# Patient Record
Sex: FEMALE | Race: WHITE | NOT HISPANIC OR LATINO
[De-identification: names, ages, dates, MRNs, and addresses within clinical notes are randomized per-mention and may not be internally consistent; named-entity substitution may affect disease eponyms.]

---

## 2016-11-27 RX ORDER — CEPHALEXIN 500 MG
1 CAPSULE ORAL
Qty: 0 | Refills: 0 | DISCHARGE
Start: 2016-11-27 | End: 2016-12-04

## 2019-05-20 ENCOUNTER — RESULT REVIEW (OUTPATIENT)
Age: 34
End: 2019-05-20

## 2022-04-06 ENCOUNTER — TRANSCRIPTION ENCOUNTER (OUTPATIENT)
Age: 37
End: 2022-04-06

## 2022-04-07 ENCOUNTER — EMERGENCY (EMERGENCY)
Facility: HOSPITAL | Age: 37
LOS: 1 days | Discharge: HOME | End: 2022-04-07
Attending: EMERGENCY MEDICINE
Payer: COMMERCIAL

## 2022-04-07 VITALS
TEMPERATURE: 98 F | HEIGHT: 63 IN | SYSTOLIC BLOOD PRESSURE: 132 MMHG | HEART RATE: 80 BPM | RESPIRATION RATE: 18 BRPM | WEIGHT: 136.91 LBS | OXYGEN SATURATION: 99 % | DIASTOLIC BLOOD PRESSURE: 71 MMHG

## 2022-04-07 VITALS
HEART RATE: 76 BPM | RESPIRATION RATE: 18 BRPM | OXYGEN SATURATION: 99 % | TEMPERATURE: 98 F | DIASTOLIC BLOOD PRESSURE: 75 MMHG | SYSTOLIC BLOOD PRESSURE: 109 MMHG

## 2022-04-07 PROCEDURE — 99284 EMERGENCY DEPT VISIT MOD MDM: CPT

## 2022-04-07 PROCEDURE — 73140 X-RAY EXAM OF FINGER(S): CPT

## 2022-04-07 PROCEDURE — 90715 TDAP VACCINE 7 YRS/> IM: CPT

## 2022-04-07 PROCEDURE — 73140 X-RAY EXAM OF FINGER(S): CPT | Mod: 26,RT

## 2022-04-07 PROCEDURE — 12041 INTMD RPR N-HF/GENIT 2.5CM/<: CPT

## 2022-04-07 PROCEDURE — 99284 EMERGENCY DEPT VISIT MOD MDM: CPT | Mod: 25

## 2022-04-07 RX ORDER — TETANUS TOXOID, REDUCED DIPHTHERIA TOXOID AND ACELLULAR PERTUSSIS VACCINE, ADSORBED 5; 2.5; 8; 8; 2.5 [IU]/.5ML; [IU]/.5ML; UG/.5ML; UG/.5ML; UG/.5ML
0.5 SUSPENSION INTRAMUSCULAR ONCE
Refills: 0 | Status: COMPLETED | OUTPATIENT
Start: 2022-04-07 | End: 2022-04-07

## 2022-04-07 RX ORDER — ACETAMINOPHEN 500 MG
975 TABLET ORAL ONCE
Refills: 0 | Status: COMPLETED | OUTPATIENT
Start: 2022-04-07 | End: 2022-04-07

## 2022-04-07 RX ORDER — CEPHALEXIN 500 MG
1 CAPSULE ORAL
Qty: 20 | Refills: 0
Start: 2022-04-07 | End: 2022-04-11

## 2022-04-07 RX ORDER — CEPHALEXIN 500 MG
500 CAPSULE ORAL ONCE
Refills: 0 | Status: COMPLETED | OUTPATIENT
Start: 2022-04-07 | End: 2022-04-07

## 2022-04-07 RX ADMIN — TETANUS TOXOID, REDUCED DIPHTHERIA TOXOID AND ACELLULAR PERTUSSIS VACCINE, ADSORBED 0.5 MILLILITER(S): 5; 2.5; 8; 8; 2.5 SUSPENSION INTRAMUSCULAR at 18:50

## 2022-04-07 RX ADMIN — Medication 500 MILLIGRAM(S): at 21:19

## 2022-04-07 RX ADMIN — Medication 975 MILLIGRAM(S): at 18:50

## 2022-04-07 NOTE — ED ADULT NURSE NOTE - OBJECTIVE STATEMENT
37 yo presents to the ED from home. A&Ox4, ambulatory c/o laceration on R hand. pmh of psoriasis of the right hand. Patient was wiping dust from her [place] when she cut her hand on a shard of glass. The piece of glass went in through the radial aspect of the right ring DIP out the ulnar aspect of the right ring finger, and then into the medial aspect of the middle finger. Pt is right hand dominant. Endorses profuse bleeding at time of injury. Dtap is UTD. Patient reports removing glass from hand after injury. Reports nausea due to the pain. Denies fevers, chills, or vomiting. well appearing. plan of care discussed. safety and comfort measures maintained.

## 2022-04-07 NOTE — ED PROVIDER NOTE - PROGRESS NOTE DETAILS
Attending MD Espinal: Dr. Moura of hand bedside [facility] RIZZO: Pt is stable and ready for discharge. Strict return precautions given. All questions answered. Pt understands the need to follow up with hand surgery within the next week. Dr. Moura aware of plan and will have his office contact the patient. Attending MD Espinal: Patient seen and wounds dressed by Dr. Moura.  Patient will need surgical intervention if wishes to have foreign body removed.  Patient will receive call tomorrow or Monday from Dr. Moura's office to set up follow up.  Will give first dose of Keflex here.  Requested check of interaction between Terbinafine and Keflex.  Checked with pharmacy, reports no interaction.  Rx Keflex.  Stable for discharge. Follow up instructions given, importance of follow up emphasized, return to ED parameters reviewed and patient verbalized understanding.  All questions answered, all concerns addressed.

## 2022-04-07 NOTE — ED ADULT TRIAGE NOTE - HAVE YOU HAD A FIRST COVID-19 BOOSTER?
Patient reports nausea, vomiting, and diarrhea since going out to eat last night. Patient having cramping abd pain.  Masked
Yes

## 2022-04-07 NOTE — ED PROVIDER NOTE - CARE PLAN
Principal Discharge DX:	Right hand pain   1 Principal Discharge DX:	Foreign body of finger of right hand  Goal:	R middle finger

## 2022-04-07 NOTE — ED ADULT NURSE NOTE - NS PRO PASSIVE SMOKE EXP
No
Pt presents with flu like symptoms. Will obtain CXR; give Motrin and Tamiflu. Pt is instructed to f/u with PMD..

## 2022-04-07 NOTE — ED ADULT NURSE REASSESSMENT NOTE - NS ED NURSE REASSESS COMMENT FT1
Report received from MARY Fallon, pt initially at Xray at beginning of shift. Pt returned from x-ray, found in position of comfort. AOx3, vital signs within normal limits, JOSEPH, distal pulses intact, abd soft nontender/nondistended, skin warm/dry intact. Found with dressings to the right hand due to lacerations. No active bleeding noted, dressing dry and intact. Pt denies any pain/discomfort to the site at this time. Denies c/p, SOB, n/v/d, fevers, chills. Safety maintained, comfort provided. Will continue to monitor.

## 2022-04-07 NOTE — ED PROVIDER NOTE - CARE PROVIDER_API CALL
Amadeo Moura (MD)  Plastic Surgery; Surgery of the Hand  [address] [facility] [address] [address] [facility] [address], [facility] [address]  [place] [address]  Phone: [phone]  Fax: [phone]  Follow Up Time:

## 2022-04-07 NOTE — ED PROVIDER NOTE - PATIENT PORTAL LINK FT
You can access the FollowMyHealth Patient Portal offered by [facility] by registering at the following website: [email or web address]. By joining [employer]’s FollowMyHealth portal, you will also be able to view your health information using other applications (apps) compatible with our system.

## 2022-04-07 NOTE — ED PROVIDER NOTE - OBJECTIVE STATEMENT
36F with PMHx/PSHx including psoriasis of the right hand presents to the ED with laceration to R ring and middle fingers. Patient was wiping dust from her [place] when she cut her hand on a shard of glass. The piece of glass went in through the radial aspect of the right ring DIP out the ulnar aspect of the right ring finger, and then into the medial aspect of the middle finger. Pt is right hand dominant. Endorses profuse bleeding at time of injury. Dtap is UTD. Patient reports removing glass from hand after injury. Reports nausea due to the pain. Denies fevers, chills, or vomiting. 36F with PMHx/PSHx including psoriasis of the right hand presents to the ED with laceration to R ring and middle fingers. Patient was wiping dust from her [place] when she cut her hand on a shard of glass. The piece of glass went in through the ulnar aspect of the right ring DIP out the radial aspect of the right ring finger, and then into the ulnar aspect of the middle finger. Pt is right hand dominant. Endorses profuse bleeding at time of injury. Dtap is UTD. Patient reports removing glass from hand after injury. Reports nausea due to the pain. Denies fevers, chills, or vomiting.

## 2022-04-07 NOTE — ED PROVIDER NOTE - SKIN, MLM
Of the right hand: there is no erythema, no edema, no active bleeding; sensation and motor function are intact. Skin normal color for race, warm, dry and intact. No evidence of rash.

## 2022-04-07 NOTE — ED PROVIDER NOTE - ATTENDING CONTRIBUTION TO CARE
Attending MD Espinal: I personally have seen and examined this patient.  Resident note reviewed and agree on plan of care and except where noted.  See below for details.     seen in [place] [facility]    36F with PMH/PSH including psoriasis presents to the ED with laceration to the R ring and middle fingers.  R hand dominant.  Reports TDAP UTD.  Reports was wiping the [place] when she cut her hand on a shard of glass reports shard went through the ulnar aspect of the pad of her ring finger, through the finger and into the ulnar aspect of the pad of her middle finger.  Reports she then pulled it out.  Reports has limitation of range of motion of R hand fingers at baseline secondary to psoriatic skin changes.  Reports her range of motion is at baseline.  Reports does not do well with blood and had nausea at time of incident.  Denies fevers, chills.  Denies any other injuries.  Denies loss of sensation.      Exam:   General: NAD  HENT: head NCAT, airway patent   Chest: symmetric chest rise, no increased work of breathing  MSK: ranging neck freely, able to flex and extend at MCP, PIP and DIP at R middle and ring fingers but unable to make a tight fist (reports baseline), cap refill <2s, no involvement of the nail bed, two puncture wounds to the middle and one to the ring finger, no surrounding erythema, no active bleeding  Neuro: moving all extremities spontaneously, sensory grossly intact at R ring and middle fingers, no gross neuro deficits  Psych: normal mood and affect   Skin: peeling skin on palm of R hand    A/P: 36F with injury to the R middle and ring fingers, will obtain XR fingers to eval for retained foreign body, will then irrigate and evaluate for need for repair, Tetanus UTD

## 2022-04-07 NOTE — ED PROVIDER NOTE - CLINICAL SUMMARY MEDICAL DECISION MAKING FREE TEXT BOX
36F with PMHx/PSHx including psoriasis of the right hand w/ glass trauma to ring and middle fingers. Will screen for fractures, dislocations, and foreign objects within finger w/ xray and reassess.

## 2022-04-07 NOTE — ED ADULT NURSE NOTE - NSIMPLEMENTINTERV_GEN_ALL_ED
Implemented All Universal Safety Interventions:  [place] to call system. Call bell, personal items and telephone within reach. Instruct patient to call for assistance. Room bathroom lighting operational. Non-slip footwear when patient is off stretcher. Physically safe environment: no spills, clutter or unnecessary equipment. Stretcher in lowest position, wheels locked, appropriate side rails in place.

## 2022-04-11 ENCOUNTER — TRANSCRIPTION ENCOUNTER (OUTPATIENT)
Age: 37
End: 2022-04-11

## 2022-04-11 ENCOUNTER — OUTPATIENT (OUTPATIENT)
Dept: OUTPATIENT SERVICES | Facility: HOSPITAL | Age: 37
LOS: 1 days | End: 2022-04-11
Payer: COMMERCIAL

## 2022-04-11 VITALS
HEART RATE: 75 BPM | RESPIRATION RATE: 16 BRPM | SYSTOLIC BLOOD PRESSURE: 117 MMHG | HEIGHT: 63 IN | DIASTOLIC BLOOD PRESSURE: 74 MMHG | OXYGEN SATURATION: 100 % | WEIGHT: 134.04 LBS | TEMPERATURE: 98 F

## 2022-04-11 DIAGNOSIS — Z01.818 ENCOUNTER FOR OTHER PREPROCEDURAL EXAMINATION: ICD-10-CM

## 2022-04-11 DIAGNOSIS — M79.5 RESIDUAL FOREIGN BODY IN SOFT TISSUE: ICD-10-CM

## 2022-04-11 DIAGNOSIS — Z98.890 OTHER SPECIFIED POSTPROCEDURAL STATES: Chronic | ICD-10-CM

## 2022-04-11 DIAGNOSIS — S61.222D: ICD-10-CM

## 2022-04-11 LAB
HCG SERPL-ACNC: <1 MIU/ML — SIGNIFICANT CHANGE UP
HCT VFR BLD CALC: 37.3 % — SIGNIFICANT CHANGE UP (ref 34.5–45)
HGB BLD-MCNC: 12.4 G/DL — SIGNIFICANT CHANGE UP (ref 11.5–15.5)
MCHC RBC-ENTMCNC: 27 PG — SIGNIFICANT CHANGE UP (ref 27–34)
MCHC RBC-ENTMCNC: 33.2 GM/DL — SIGNIFICANT CHANGE UP (ref 32–36)
MCV RBC AUTO: 81.1 FL — SIGNIFICANT CHANGE UP (ref 80–100)
NRBC # BLD: 0 /100 WBCS — SIGNIFICANT CHANGE UP (ref 0–0)
PLATELET # BLD AUTO: 332 K/UL — SIGNIFICANT CHANGE UP (ref 150–400)
RBC # BLD: 4.6 M/UL — SIGNIFICANT CHANGE UP (ref 3.8–5.2)
RBC # FLD: 13.7 % — SIGNIFICANT CHANGE UP (ref 10.3–14.5)
SARS-COV-2 RNA SPEC QL NAA+PROBE: SIGNIFICANT CHANGE UP
WBC # BLD: 6.59 K/UL — SIGNIFICANT CHANGE UP (ref 3.8–10.5)
WBC # FLD AUTO: 6.59 K/UL — SIGNIFICANT CHANGE UP (ref 3.8–10.5)

## 2022-04-11 PROCEDURE — U0005: CPT

## 2022-04-11 PROCEDURE — 85027 COMPLETE CBC AUTOMATED: CPT

## 2022-04-11 PROCEDURE — 36415 COLL VENOUS BLD VENIPUNCTURE: CPT

## 2022-04-11 PROCEDURE — 84702 CHORIONIC GONADOTROPIN TEST: CPT

## 2022-04-11 PROCEDURE — G0463: CPT

## 2022-04-11 PROCEDURE — U0003: CPT

## 2022-04-11 NOTE — H&P PST ADULT - GENERAL COMMENTS
Had COVID, last week on Jan; Fully vaccinated; Denies recent international travel, recent illness and sick contact with COVID in the last 3 weeks

## 2022-04-11 NOTE — H&P PST ADULT - NSANTHOSAYNRD_GEN_A_CORE
No. MELODY screening performed.  STOP BANG Legend: 0-2 = LOW Risk; 3-4 = INTERMEDIATE Risk; 5-8 = HIGH Risk

## 2022-04-11 NOTE — H&P PST ADULT - NSICDXPASTSURGICALHX_GEN_ALL_CORE_FT
PAST SURGICAL HISTORY:  No significant past surgical history      PAST SURGICAL HISTORY:  H/O endoscopy

## 2022-04-11 NOTE — H&P PST ADULT - HISTORY OF PRESENT ILLNESS
exploration right middle finger - removal foreign body with mini fluoro on 4/12 with Dr. Moura 35 yo right hand dominant female, presents to [facility] scheduled for an exploration right middle finger - removal foreign body with mini fluoro on 4/12 with Dr. Moura. Reports that she was seen at [facility] ED for a retained piece of glass in her right middle finger. She initially had glass in both her right middle and ring finger but was able to remove the glass from the ring finger herself. Reports that the glass in her middle finger was to deep to remove in the ED. Both lacerated fingers were sutured. Reports discomfort with ROM. Presently on Abx.

## 2022-04-11 NOTE — H&P PST ADULT - NSICDXPASTMEDICALHX_GEN_ALL_CORE_FT
PAST MEDICAL HISTORY:  No pertinent past medical history      PAST MEDICAL HISTORY:  Laceration w FB of r mid finger w/o damage to nail, subs     No pertinent past medical history     Residual foreign body in soft tissue, hand

## 2022-04-11 NOTE — H&P PST ADULT - PROBLEM SELECTOR PLAN 2
Labs  CBC, HCG and COVID PCR.   No MC needed or requested  Pre op and Hibiclens instructions reviewed and given. No meds DOS. Instructed to hold and/or avoid other NSAIDs and OTC supplements. Tylenol is ok. Verbalized understanding

## 2022-04-12 ENCOUNTER — TRANSCRIPTION ENCOUNTER (OUTPATIENT)
Age: 37
End: 2022-04-12

## 2022-04-12 ENCOUNTER — RESULT REVIEW (OUTPATIENT)
Age: 37
End: 2022-04-12

## 2022-04-12 ENCOUNTER — OUTPATIENT (OUTPATIENT)
Dept: OUTPATIENT SERVICES | Facility: HOSPITAL | Age: 37
LOS: 1 days | Discharge: HOME | End: 2022-04-12
Payer: COMMERCIAL

## 2022-04-12 VITALS
OXYGEN SATURATION: 100 % | RESPIRATION RATE: 14 BRPM | TEMPERATURE: 99 F | SYSTOLIC BLOOD PRESSURE: 134 MMHG | HEART RATE: 89 BPM | WEIGHT: 134.04 LBS | DIASTOLIC BLOOD PRESSURE: 74 MMHG | HEIGHT: 63 IN

## 2022-04-12 VITALS
OXYGEN SATURATION: 98 % | HEART RATE: 68 BPM | RESPIRATION RATE: 12 BRPM | SYSTOLIC BLOOD PRESSURE: 111 MMHG | DIASTOLIC BLOOD PRESSURE: 72 MMHG

## 2022-04-12 DIAGNOSIS — Z98.890 OTHER SPECIFIED POSTPROCEDURAL STATES: Chronic | ICD-10-CM

## 2022-04-12 DIAGNOSIS — S61.222D: ICD-10-CM

## 2022-04-12 DIAGNOSIS — M79.5 RESIDUAL FOREIGN BODY IN SOFT TISSUE: ICD-10-CM

## 2022-04-12 PROCEDURE — 88300 SURGICAL PATH GROSS: CPT

## 2022-04-12 PROCEDURE — 10120 INC&RMVL FB SUBQ TISS SMPL: CPT

## 2022-04-12 PROCEDURE — 88300 SURGICAL PATH GROSS: CPT | Mod: 26

## 2022-04-12 PROCEDURE — 76000 FLUOROSCOPY <1 HR PHYS/QHP: CPT

## 2022-04-12 RX ORDER — HYDROMORPHONE HYDROCHLORIDE 2 MG/ML
0.5 INJECTION INTRAMUSCULAR; INTRAVENOUS; SUBCUTANEOUS
Refills: 0 | Status: DISCONTINUED | OUTPATIENT
Start: 2022-04-12 | End: 2022-04-12

## 2022-04-12 RX ORDER — DM/PSEUDOEPHED/ACETAMINOPHEN 15-30-325
0 CAPSULE ORAL
Qty: 0 | Refills: 0 | DISCHARGE

## 2022-04-12 RX ORDER — ZINC SULFATE TAB 220 MG (50 MG ZINC EQUIVALENT) 220 (50 ZN) MG
1 TAB ORAL
Qty: 0 | Refills: 0 | DISCHARGE

## 2022-04-12 RX ORDER — OXYMETAZOLINE HYDROCHLORIDE 0.5 MG/ML
2 SPRAY NASAL
Qty: 0 | Refills: 0 | DISCHARGE

## 2022-04-12 RX ORDER — SODIUM CHLORIDE 9 MG/ML
1000 INJECTION, SOLUTION INTRAVENOUS
Refills: 0 | Status: DISCONTINUED | OUTPATIENT
Start: 2022-04-12 | End: 2022-04-12

## 2022-04-12 RX ORDER — ACETAMINOPHEN 500 MG
1000 TABLET ORAL ONCE
Refills: 0 | Status: DISCONTINUED | OUTPATIENT
Start: 2022-04-12 | End: 2022-04-12

## 2022-04-12 RX ORDER — TERBINAFINE HCL 250 MG
1 TABLET ORAL
Qty: 0 | Refills: 0 | DISCHARGE

## 2022-04-12 RX ORDER — CEFAZOLIN SODIUM 1 G
2000 VIAL (EA) INJECTION ONCE
Refills: 0 | Status: COMPLETED | OUTPATIENT
Start: 2022-04-12 | End: 2022-04-12

## 2022-04-12 RX ORDER — ACETAMINOPHEN 500 MG
2 TABLET ORAL
Qty: 0 | Refills: 0 | DISCHARGE

## 2022-04-12 RX ORDER — ONDANSETRON 8 MG/1
4 TABLET, FILM COATED ORAL ONCE
Refills: 0 | Status: DISCONTINUED | OUTPATIENT
Start: 2022-04-12 | End: 2022-04-12

## 2022-04-12 RX ADMIN — SODIUM CHLORIDE 75 MILLILITER(S): 9 INJECTION, SOLUTION INTRAVENOUS at 17:09

## 2022-04-12 NOTE — ASU DISCHARGE PLAN (ADULT/PEDIATRIC) - NS MD DC FALL RISK RISK
For information on Fall & Injury Prevention, visit: [email or web address] OR  [email or web address] OR  https://www.cdc.gov/steadi/patient.html

## 2022-04-12 NOTE — ASU DISCHARGE PLAN (ADULT/PEDIATRIC) - CARE PROVIDER_API CALL
Amadeo Moura (MD)  Plastic Surgery; Surgery of the Hand  [address], [address]  [place] [address]  Phone: [phone]  Fax: [phone]  Scheduled Appointment: 04/25/2022

## 2022-04-12 NOTE — PRE-OP CHECKLIST - AS BP NONINV METHOD
electronic Curvilinear Excision Additional Text (Leave Blank If You Do Not Want): The margin was drawn around the clinically apparent lesion.  A curvilinear shape was then drawn on the skin incorporating the lesion and margins.  Incisions were then made along these lines to the appropriate tissue plane and the lesion was extirpated.

## 2022-04-14 LAB — SURGICAL PATHOLOGY STUDY: SIGNIFICANT CHANGE UP

## 2022-07-13 ENCOUNTER — RESULT REVIEW (OUTPATIENT)
Age: 37
End: 2022-07-13

## 2022-10-20 NOTE — PRE-OP CHECKLIST - ALLERGY BAND ON
Patient instructed to keep the thumb elevated use dry heat if necessary and avoid soaking and digging into the area.  
done

## 2023-06-23 PROBLEM — M79.5 RESIDUAL FOREIGN BODY IN SOFT TISSUE: Chronic | Status: ACTIVE | Noted: 2022-04-11

## 2023-06-23 PROBLEM — S61.222D: Chronic | Status: ACTIVE | Noted: 2022-04-11

## 2023-06-23 PROBLEM — L40.9 PSORIASIS, UNSPECIFIED: Chronic | Status: ACTIVE | Noted: 2022-04-07

## 2023-07-03 ENCOUNTER — APPOINTMENT (OUTPATIENT)
Dept: CARDIOLOGY | Facility: CLINIC | Age: 38
End: 2023-07-03

## 2023-08-17 ENCOUNTER — APPOINTMENT (OUTPATIENT)
Dept: CARDIOLOGY | Facility: CLINIC | Age: 38
End: 2023-08-17
Payer: COMMERCIAL

## 2023-08-17 VITALS — DIASTOLIC BLOOD PRESSURE: 64 MMHG | SYSTOLIC BLOOD PRESSURE: 120 MMHG | WEIGHT: 138 LBS

## 2023-08-17 VITALS
HEIGHT: 63 IN | SYSTOLIC BLOOD PRESSURE: 124 MMHG | DIASTOLIC BLOOD PRESSURE: 78 MMHG | HEART RATE: 88 BPM | OXYGEN SATURATION: 100 %

## 2023-08-17 DIAGNOSIS — R00.2 PALPITATIONS: ICD-10-CM

## 2023-08-17 PROCEDURE — 93000 ELECTROCARDIOGRAM COMPLETE: CPT

## 2023-08-17 PROCEDURE — 99204 OFFICE O/P NEW MOD 45 MIN: CPT | Mod: 25

## 2023-08-17 RX ORDER — APREMILAST 30 MG/1
TABLET, FILM COATED ORAL
Refills: 0 | Status: ACTIVE | COMMUNITY

## 2023-08-17 NOTE — HISTORY OF PRESENT ILLNESS
[FreeTextEntry1] : Dear Rea, Thank you for referring her for cardiovascular evaluation.  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  She describes that, since June 2023, she has had multiple episodes of brief rapid fluttering sensations in her chest that lasted approximately 5 seconds and resolve spontaneously.  These can happen multiple times a day and even happen frequently over hours.  During the episode she denies any lightheadedness, dizziness or syncope. These episodes are not worse with exertion, though she does not perform routine aerobic activity. With her daily activity she denies any chest pains or shortness of breath. She has no history of coronary artery disease, hypertension, diabetes mellitus (though recent blood work showed an A1c in the prediabetic range) hypercholesterolemia, smoking or known family history of premature coronary artery disease. Recent TSH was in the normal range.

## 2023-08-17 NOTE — PHYSICAL EXAM
[Well Developed] : well developed [Well Nourished] : well nourished [Normal Conjunctiva] : normal conjunctiva [Normal Venous Pressure] : normal venous pressure [Normal S1, S2] : normal S1, S2 [Clear Lung Fields] : clear lung fields [Good Air Entry] : good air entry [Soft] : abdomen soft [Normal Gait] : normal gait [No Edema] : no edema [No Rash] : no rash [Moves all extremities] : moves all extremities [Alert and Oriented] : alert and oriented [Normal memory] : normal memory

## 2023-08-17 NOTE — DISCUSSION/SUMMARY
[FreeTextEntry1] : She is a 37-year-old with episodes of palpitations that do not appear to be high risk.  ECG shows normal sinus rhythm with no acute ST segment abnormalities or sign of conduction disease. I suggested that she wear a 3-day cardiac extended monitor in order to define the morphology of these palpitations. An echocardiogram will exclude any structural heart disease. If no cardiac abnormalities are found consideration for GI work-up Or esophageal spasm treatment may be considered.. [EKG obtained to assist in diagnosis and management of assessed problem(s)] : EKG obtained to assist in diagnosis and management of assessed problem(s)

## 2023-09-15 ENCOUNTER — APPOINTMENT (OUTPATIENT)
Dept: MAMMOGRAPHY | Facility: CLINIC | Age: 38
End: 2023-09-15
Payer: COMMERCIAL

## 2023-09-15 ENCOUNTER — APPOINTMENT (OUTPATIENT)
Dept: ULTRASOUND IMAGING | Facility: CLINIC | Age: 38
End: 2023-09-15
Payer: COMMERCIAL

## 2023-09-15 PROCEDURE — 76641 ULTRASOUND BREAST COMPLETE: CPT | Mod: 50

## 2023-09-15 PROCEDURE — 77067 SCR MAMMO BI INCL CAD: CPT

## 2023-09-15 PROCEDURE — 77063 BREAST TOMOSYNTHESIS BI: CPT

## 2023-09-22 ENCOUNTER — APPOINTMENT (OUTPATIENT)
Dept: CARDIOLOGY | Facility: CLINIC | Age: 38
End: 2023-09-22
Payer: COMMERCIAL

## 2023-09-22 PROCEDURE — 93306 TTE W/DOPPLER COMPLETE: CPT

## 2024-03-28 ENCOUNTER — APPOINTMENT (OUTPATIENT)
Dept: MRI IMAGING | Facility: CLINIC | Age: 39
End: 2024-03-28
Payer: COMMERCIAL

## 2024-03-28 ENCOUNTER — RESULT REVIEW (OUTPATIENT)
Age: 39
End: 2024-03-28

## 2024-03-28 PROCEDURE — A9585: CPT | Mod: JW

## 2024-03-28 PROCEDURE — 77049 MRI BREAST C-+ W/CAD BI: CPT

## 2024-10-21 ENCOUNTER — RESULT REVIEW (OUTPATIENT)
Age: 39
End: 2024-10-21

## 2024-11-04 ENCOUNTER — RESULT REVIEW (OUTPATIENT)
Age: 39
End: 2024-11-04

## 2024-11-04 ENCOUNTER — APPOINTMENT (OUTPATIENT)
Dept: ULTRASOUND IMAGING | Facility: CLINIC | Age: 39
End: 2024-11-04
Payer: COMMERCIAL

## 2024-11-04 ENCOUNTER — APPOINTMENT (OUTPATIENT)
Dept: MAMMOGRAPHY | Facility: CLINIC | Age: 39
End: 2024-11-04
Payer: COMMERCIAL

## 2024-11-04 PROCEDURE — 77063 BREAST TOMOSYNTHESIS BI: CPT

## 2024-11-04 PROCEDURE — 77067 SCR MAMMO BI INCL CAD: CPT

## 2024-11-04 PROCEDURE — 76641 ULTRASOUND BREAST COMPLETE: CPT | Mod: 50

## 2025-05-02 ENCOUNTER — APPOINTMENT (OUTPATIENT)
Dept: MRI IMAGING | Facility: CLINIC | Age: 40
End: 2025-05-02

## 2025-05-02 ENCOUNTER — RESULT REVIEW (OUTPATIENT)
Age: 40
End: 2025-05-02

## 2025-05-02 PROCEDURE — A9585: CPT

## 2025-05-02 PROCEDURE — 77049 MRI BREAST C-+ W/CAD BI: CPT

## 2025-06-18 NOTE — H&P PST ADULT - PROBLEM/PLAN-1
Image guided Paracentesis completed.  7.7 liters of cloudy yellow colored withdrawn.  Pt tolerated procedure without any signs or symptoms of distress. Vital signs stable. IV in right arm via Vascular Access team.     DISCHARGED:  SDS: Discharge instructions reviewed with patient. Verbalized understanding. Patient taken to [facility] for albumin infusion. Report given to nurse.    SPECIMEN SENT:  No    Vital Signs  Vitals:    06/18/25 1145   BP: 137/64   Pulse: 88   SpO2: 97%    (vital signs in table format)    DISPLAY PLAN FREE TEXT

## (undated) DEVICE — GLV 7.5 PROTEXIS (WHITE)

## (undated) DEVICE — SUT SURGIPRO II 4-0 18" P-12

## (undated) DEVICE — DRSG XEROFORM 1 X 8"

## (undated) DEVICE — DRAPE LIGHT HANDLE COVER (GREEN)

## (undated) DEVICE — SUT MONOSOF 4-0 18" P-12

## (undated) DEVICE — ELCTR BOVIE PENCIL SMOKE EVACUATION

## (undated) DEVICE — DRAPE INSTRUMENT POUCH 6.75" X 11"

## (undated) DEVICE — VENODYNE/SCD SLEEVE CALF MEDIUM

## (undated) DEVICE — WARMING BLANKET LOWER ADULT

## (undated) DEVICE — ELCTR BIPOLAR CORD J&J 12FT DISP

## (undated) DEVICE — PLV-SCD MACHINE: Type: DURABLE MEDICAL EQUIPMENT

## (undated) DEVICE — PACK HAND

## (undated) DEVICE — DRAPE 3/4 SHEET W REINFORCEMENT 56X77"

## (undated) DEVICE — DRAPE U POLY BLUE 60"X60"

## (undated) DEVICE — DRSG KLING 4"

## (undated) DEVICE — TOURNIQUET CUFF 18" DUAL PORT DUAL BLADDER W PLC (BLACK)

## (undated) DEVICE — DRSG CURITY GAUZE SPONGE 4 X 4" 12-PLY

## (undated) DEVICE — VENODYNE/SCD SLEEVE CALF LARGE

## (undated) DEVICE — DRSG WEBRIL 4"

## (undated) DEVICE — Device